# Patient Record
(demographics unavailable — no encounter records)

---

## 2025-06-04 NOTE — ASSESSMENT
[FreeTextEntry1] : 21-year-old male with thalassemia trait presenting for a CPE.  No concerns today. Not sexually active. Drinks alcohol socially, no tobacco use.  HCM: Tdap UTD (2024) Labs today  Follow up for next CPE, sooner if needed   Mary Parekh MD [Vaccines Reviewed] : Immunizations reviewed today. Please see immunization details in the vaccine log within the immunization flowsheet.

## 2025-06-04 NOTE — HISTORY OF PRESENT ILLNESS
[de-identified] : 21-year-old male with thalassemia trait presenting for a CPE.   No concerns today. Not sexually active. Drinks alcohol socially, no tobacco use.   HCM:  Tdap UTD (2024)  Labs today

## 2025-06-04 NOTE — HEALTH RISK ASSESSMENT
[Yes] : Yes [Monthly or less (1 pt)] : Monthly or less (1 point) [1 or 2 (0 pts)] : 1 or 2 (0 points) [Never (0 pts)] : Never (0 points) [No] : In the past 12 months have you used drugs other than those required for medical reasons? No [0] : 2) Feeling down, depressed, or hopeless: Not at all (0) [PHQ-2 Negative - No further assessment needed] : PHQ-2 Negative - No further assessment needed [Never] : Never [Audit-CScore] : 1 [[id number]] : 0

## 2025-06-04 NOTE — PHYSICAL EXAM
[Normal Oropharynx] : the oropharynx was normal [Normal TMs] : both tympanic membranes were normal [No Lymphadenopathy] : no lymphadenopathy [No Edema] : there was no peripheral edema [Soft] : abdomen soft [Non Tender] : non-tender [Non-distended] : non-distended [Normal Gait] : normal gait [Normal] : affect was normal and insight and judgment were intact